# Patient Record
Sex: FEMALE | Race: WHITE | NOT HISPANIC OR LATINO | Employment: OTHER | URBAN - METROPOLITAN AREA
[De-identification: names, ages, dates, MRNs, and addresses within clinical notes are randomized per-mention and may not be internally consistent; named-entity substitution may affect disease eponyms.]

---

## 2017-02-20 DIAGNOSIS — E11.22 TYPE 2 DIABETES MELLITUS WITH STAGE 3 CHRONIC KIDNEY DISEASE, WITHOUT LONG-TERM CURRENT USE OF INSULIN (H): ICD-10-CM

## 2017-02-20 DIAGNOSIS — N18.30 TYPE 2 DIABETES MELLITUS WITH STAGE 3 CHRONIC KIDNEY DISEASE, WITHOUT LONG-TERM CURRENT USE OF INSULIN (H): ICD-10-CM

## 2017-02-20 RX ORDER — GLIPIZIDE 5 MG/1
5 TABLET, FILM COATED, EXTENDED RELEASE ORAL DAILY
Qty: 90 TABLET | Refills: 1 | Status: SHIPPED | OUTPATIENT
Start: 2017-02-20

## 2017-02-20 NOTE — TELEPHONE ENCOUNTER
glipizide         Last Written Prescription Date: 11/21/16  Last Fill Quantity: 90, # refills: 1  Last Office Visit with G, P or [facility] prescribing provider:  11/21/16        BP Readings from Last 3 Encounters:   11/23/16 (!) 162/92   11/21/16 (!) 148/96   06/27/16 134/80     Lab Results   Component Value Date    MICROL 13 11/21/2016     No results found for: MICROALBUMIN  Creatinine   Date Value Ref Range Status   11/21/2016 0.98 0.52 - 1.04 mg/dL Final   ]  GFR Estimate   Date Value Ref Range Status   11/21/2016 54 (L) >60 mL/min/1.7m2 Final     Comment:     Non  GFR Calc   12/01/2015 61 >60 mL/min/1.7m2 Final     Comment:     Non  GFR Calc   11/16/2015 58 (L) >60 mL/min/1.7m2 Final     Comment:     Non  GFR Calc     GFR Estimate If Black   Date Value Ref Range Status   11/21/2016 66 >60 mL/min/1.7m2 Final     Comment:      GFR Calc   12/01/2015 73 >60 mL/min/1.7m2 Final     Comment:      GFR Calc   11/16/2015 70 >60 mL/min/1.7m2 Final     Comment:      GFR Calc     Lab Results   Component Value Date    CHOL 222 11/21/2016     Lab Results   Component Value Date    HDL 40 11/21/2016     Lab Results   Component Value Date     11/21/2016     Lab Results   Component Value Date    TRIG 254 11/21/2016     Lab Results   Component Value Date    CHOLHDLRATIO 5.3 01/14/2015     Lab Results   Component Value Date    AST 18 11/21/2016     Lab Results   Component Value Date    ALT 21 11/21/2016     Lab Results   Component Value Date    A1C 6.6 11/21/2016    A1C 6.4 11/16/2015    A1C 6.2 01/14/2015    A1C 6.6 06/05/2014    A1C 6.4 11/13/2013     Potassium   Date Value Ref Range Status   11/21/2016 4.3 3.4 - 5.3 mmol/L Final

## 2017-06-06 DIAGNOSIS — N18.30 TYPE 2 DIABETES MELLITUS WITH STAGE 3 CHRONIC KIDNEY DISEASE, WITHOUT LONG-TERM CURRENT USE OF INSULIN (H): ICD-10-CM

## 2017-06-06 DIAGNOSIS — E11.22 TYPE 2 DIABETES MELLITUS WITH STAGE 3 CHRONIC KIDNEY DISEASE, WITHOUT LONG-TERM CURRENT USE OF INSULIN (H): ICD-10-CM

## 2017-06-07 RX ORDER — GLIPIZIDE 5 MG/1
TABLET, FILM COATED, EXTENDED RELEASE ORAL
Qty: 30 TABLET | Refills: 0 | Status: SHIPPED | OUTPATIENT
Start: 2017-06-07 | End: 2017-06-20

## 2017-06-07 NOTE — TELEPHONE ENCOUNTER
Medication is being filled for 1 time refill only due to:  Patient needs to be seen because due for 6 month check..   Francy Ngo RN

## 2017-06-07 NOTE — TELEPHONE ENCOUNTER
glipiZIDE (GLUCOTROL XL) 5 MG 24 hr tablet         Last Written Prescription Date: 2/20/17  Last Fill Quantity: 90, # refills: 1  Last Office Visit with [facility], [facility] or [facility] prescribing provider:  11/21/16        BP Readings from Last 3 Encounters:   11/23/16 (!) 162/92   11/21/16 (!) 148/96   06/27/16 134/80     Lab Results   Component Value Date    MICROL 13 11/21/2016     Lab Results   Component Value Date    UMALCR 13.90 11/21/2016     Creatinine   Date Value Ref Range Status   11/21/2016 0.98 0.52 - 1.04 mg/dL Final   ]  GFR Estimate   Date Value Ref Range Status   11/21/2016 54 (L) >60 mL/min/1.7m2 Final     Comment:     Non  GFR Calc   12/01/2015 61 >60 mL/min/1.7m2 Final     Comment:     Non  GFR Calc   11/16/2015 58 (L) >60 mL/min/1.7m2 Final     Comment:     Non  GFR Calc     GFR Estimate If Black   Date Value Ref Range Status   11/21/2016 66 >60 mL/min/1.7m2 Final     Comment:      GFR Calc   12/01/2015 73 >60 mL/min/1.7m2 Final     Comment:      GFR Calc   11/16/2015 70 >60 mL/min/1.7m2 Final     Comment:      GFR Calc     Lab Results   Component Value Date    CHOL 222 11/21/2016     Lab Results   Component Value Date    HDL 40 11/21/2016     Lab Results   Component Value Date     11/21/2016     Lab Results   Component Value Date    TRIG 254 11/21/2016     Lab Results   Component Value Date    CHOLHDLRATIO 5.3 01/14/2015     Lab Results   Component Value Date    AST 18 11/21/2016     Lab Results   Component Value Date    ALT 21 11/21/2016     Lab Results   Component Value Date    A1C 6.6 11/21/2016    A1C 6.4 11/16/2015    A1C 6.2 01/14/2015    A1C 6.6 06/05/2014    A1C 6.4 11/13/2013     Potassium   Date Value Ref Range Status   11/21/2016 4.3 3.4 - 5.3 mmol/L Final

## 2017-06-15 DIAGNOSIS — I10 ESSENTIAL HYPERTENSION WITH GOAL BLOOD PRESSURE LESS THAN 140/90: ICD-10-CM

## 2017-06-16 RX ORDER — CARVEDILOL 25 MG/1
TABLET ORAL
Qty: 180 TABLET | Refills: 0 | Status: SHIPPED | OUTPATIENT
Start: 2017-06-16

## 2017-06-16 NOTE — TELEPHONE ENCOUNTER
Routing refill request to provider for review/approval because:  Blood pressures are elevated above goal.    Cherelle Winter RN

## 2017-06-16 NOTE — TELEPHONE ENCOUNTER
Carvedilol       Last Written Prescription Date: 11/21/2016  Last Fill Quantity: 180, # refills: 1    Last Office Visit with G, [facility] or [facility] prescribing provider:  11/21/2016   Future Office Visit:        BP Readings from Last 3 Encounters:   11/23/16 (!) 162/92   11/21/16 (!) 148/96   06/27/16 134/80

## 2017-06-20 ENCOUNTER — OFFICE VISIT (OUTPATIENT)
Dept: ORTHOPEDICS | Facility: CLINIC | Age: 82
End: 2017-06-20
Payer: COMMERCIAL

## 2017-06-20 VITALS — TEMPERATURE: 97.7 F | BODY MASS INDEX: 33.15 KG/M2 | WEIGHT: 199 LBS | HEIGHT: 65 IN

## 2017-06-20 DIAGNOSIS — M25.561 PATELLOFEMORAL ARTHRALGIA OF RIGHT KNEE: ICD-10-CM

## 2017-06-20 DIAGNOSIS — M17.11 PRIMARY OSTEOARTHRITIS OF RIGHT KNEE: Primary | ICD-10-CM

## 2017-06-20 PROCEDURE — 99213 OFFICE O/P EST LOW 20 MIN: CPT | Mod: 25 | Performed by: ORTHOPAEDIC SURGERY

## 2017-06-20 PROCEDURE — 20610 DRAIN/INJ JOINT/BURSA W/O US: CPT | Mod: RT | Performed by: ORTHOPAEDIC SURGERY

## 2017-06-20 ASSESSMENT — PAIN SCALES - GENERAL: PAINLEVEL: MODERATE PAIN (5)

## 2017-06-20 NOTE — MR AVS SNAPSHOT
"              After Visit Summary   6/20/2017    Patsy J Behm    MRN: [id number]           Patient Information     Date Of Birth          1935        Visit Information        Provider Department      6/20/2017 2:00 PM Adam Greer MD [facility]        Today's Diagnoses     Primary osteoarthritis of right knee    -  1    Patellofemoral arthralgia of right knee           Follow-ups after your visit        Who to contact     If you have questions or need follow up information about today's clinic visit or your schedule please contact [facility] directly at [phone].  Normal or non-critical lab and imaging results will be communicated to you by [facility] [employer], letter or phone within 4 business days after the clinic has received the results. If you do not hear from us within 7 days, please contact the clinic through [email or web address] or phone. If you have a critical or abnormal lab result, we will notify you by phone as soon as possible.  Submit refill requests through [employer] or call your pharmacy and they will forward the refill request to us. Please allow 3 business days for your refill to be completed.          Additional Information About Your Visit        MyChart Information     [employer] gives you secure access to your electronic health record. If you see a primary care provider, you can also send messages to your care team and make appointments. If you have questions, please call your primary care clinic.  If you do not have a primary care provider, please call [phone] and they will assist you.        Care EveryWhere ID     This is your Care EveryWhere ID. This could be used by other organizations to access your [place] medical records  [id number]        Your Vitals Were     Temperature Height BMI (Body Mass Index)             97.7  F (36.5  C) (Temporal) 1.638 m (5' 4.5\") 33.63 kg/m2          Blood Pressure from Last 3 Encounters:   11/23/16 (!) 162/92   11/21/16 (!) 148/96 "   06/27/16 134/80    Weight from Last 3 Encounters:   06/20/17 90.3 kg (199 lb)   11/21/16 96.2 kg (212 lb)   09/29/16 95.3 kg (210 lb)              We Performed the Following     C SYNVISC PER 1 MG     Large Joint/Bursa injection and/or drainage (Shoulder, Knee)          Today's Medication Changes          These changes are accurate as of: 6/20/17  5:40 PM.  If you have any questions, ask your nurse or doctor.               Start taking these medicines.        Dose/Directions    Hylan 48 MG/6ML Sosy injection   Commonly known as:  SYNVISC ONE   Used for:  Primary osteoarthritis of right knee, Patellofemoral arthralgia of right knee   Started by:  Adam Greer MD        Dose:  16 mg   16 mg by INTRA-ARTICULAR route once for 1 dose   Quantity:  2 mL   Refills:  0            Where to get your medicines      Some of these will need a paper prescription and others can be bought over the counter.  Ask your nurse if you have questions.     You don't need a prescription for these medications     Hylan 48 MG/6ML Sosy injection                Primary Care Provider Office Phone # Fax #    Lul Hsu [phone]       [facility] [address] [place] [address]        Thank you!     Thank you for choosing [facility]  for your care. Our goal is always to provide you with excellent care. Hearing back from our patients is one way we can continue to improve our services. Please take a few minutes to complete the written survey that you may receive in the mail after your visit with us. Thank you!             Your Updated Medication List - Protect others around you: Learn how to safely use, store and throw away your medicines at www.disposemymeds.org.          This list is accurate as of: 6/20/17  5:40 PM.  Always use your most recent med list.                   Brand Name Dispense Instructions for use    acetaminophen 650 MG 8 hour tablet     100 tablet    Take 650 mg  by mouth every 4 hours as needed for other (surgical pain)       ascorbic acid 500 MG tablet    VITAMIN C     1 TABLET DAILY       ASPIRIN NOT PRESCRIBED    INTENTIONAL     by Other route continuous prn.       blood glucose monitoring lancets     1 Each    TEST 2 TIMES A DAY       * blood glucose monitoring test strip    no brand specified    50 each    by In Vitro route. Test once daily       * ONE TOUCH ULTRA test strip   Generic drug:  blood glucose monitoring     100 strip    USE ONE STRIP TWICE DAILY       carvedilol 25 MG tablet    COREG    180 tablet    TAKE ONE TABLET BY MOUTH TWICE DAILY WITH MEALS       CoQ10 100 MG Caps      Take 1 capsule by mouth daily       diphenhydrAMINE-acetaminophen  MG tablet    TYLENOL PM     Take 1 tablet by mouth nightly as needed for sleep       FIBERCON PO      Take by mouth daily       furosemide 20 MG tablet    LASIX    45 tablet    Take 0.5 tablets (10 mg) by mouth daily       Ginkgo Biloba 120 MG Caps      1 CAPSULE TWICE DAILY       glipiZIDE 5 MG 24 hr tablet    GLUCOTROL XL    90 tablet    Take 1 tablet (5 mg) by mouth daily       Hylan 48 MG/6ML Sosy injection    SYNVISC ONE    2 mL    16 mg by INTRA-ARTICULAR route once for 1 dose       losartan 100 MG tablet    COZAAR    45 tablet    Take 1 tablet (100 mg) by mouth daily       Lutein 15-0.7 MG Caps      20 mg daily       Melatonin 10 MG Caps      Take by mouth At Bedtime       MULTI-DAY Tabs      1 TABLET DAILY       Red Yeast Rice 600 MG Tabs      Take 2 tablets by mouth 2 times daily       * Notice:  This list has 2 medication(s) that are the same as other medications prescribed for you. Read the directions carefully, and ask your doctor or other care provider to review them with you.

## 2017-06-20 NOTE — PROGRESS NOTES
"   HISTORY OF PRESENT ILLNESS:    Patsy J Behm is a 81 year old female who is seen in follow up for   Chief Complaint   Patient presents with     RECHECK     Right knee pain.     Intake     Pain started on Sunday.  pain started with pain in the back of knee but quickly progressed to sharp pain that she could not step on.   Present symptoms:  Patient reports as above. She stated the pain began almost instantaneously with no known injury or irritation to her right knee. She stated it was almost 9 months to the day she obtained her previous Synvisc just a couple days after. Patient states she now lives in [place]  Treatments tried to this point: Previous cortisone as well as Synvisc injections  Family present: Daughter  Patient evaluation done with Dr. Greer    Physical Exam:  Vitals: Temp 97.7  F (36.5  C) (Temporal)  Ht 1.638 m (5' 4.5\")  Wt 90.3 kg (199 lb)  BMI 33.63 kg/m2  BMI= Body mass index is 33.63 kg/(m^2).  Constitutional: healthy, alert and no acute distress   Psychiatric: mentation appears normal and affect normal/bright  NEURO: no focal deficits  SKIN: no excoriation or erythema. No signs of infection.  JOINT/EXTREMITIES:  Affected extremity pulses are easily palpable.  Right Knee Exam: Inspection: No significant effusion  Tender: lateral joint line, medial joint line, patient is tender almost diffusely through the knee but greatest at the joint lines.  Active Range of Motion:  Patient is able fully straighten her knee and flex to 120  or better  GAIT: Very antalgic    IMAGING INTERPRETATION: X-rays were reviewed. Osteophytes are present at the medial aspect of the knee and at the patellofemoral joint. There is minimal space between the patella and the femoral groove that is bone-on-bone  Independent visualization of the images was performed.     ASSESSMENT:    Chief Complaint   Patient presents with     RECHECK     Right knee pain.     Intake     Pain started on Sunday.  pain started with pain in " the back of knee but quickly progressed to sharp pain that she could not step on.       ICD-10-CM    1. Primary osteoarthritis of right knee M17.11    2. Patellofemoral arthralgia of right knee M25.561      Patient with considerable patellofemoral osteoarthritis that does also affect the medial compartment as well.    Plan:   Synvisc injection worked well for about 9 months in the past. Patient is seeking repeat injection    After risks and benefits were explained and questions answered, the patient agreed to undergo the recommended procedure .   Using aseptic technique the skin was prepped with betadine swabs, then sprayed with ethyl chloride for topical anesthesia.  The right  Knee  lateral infrapatellar: joint space was then infiltrated with 5cc of 1% Lidocaine without epinephrine and 6ml of Synvisc One.  There were no complications and the patient tolerated the procedure well. Patient is advised can take up to 6 weeks for the benefit to be realized.  She can repeat this injection as soon as every 6 months    Return to clinic as needed.      Scribed by  Mi Moses PA-C   6/20/2017  3:06 PM      I attest I have seen and evaluated the patient.  I agree with above impression and plan.    Adam Greer MD

## 2017-06-20 NOTE — NURSING NOTE
"Chief Complaint   Patient presents with     RECHECK     Right knee pain.     Intake     Pain started on Sunday.  pain started with pain in the back of knee but quickly progressed to sharp pain that she could not step on.       Initial Temp 97.7  F (36.5  C) (Temporal)  Ht 1.638 m (5' 4.5\")  Wt 90.3 kg (199 lb)  BMI 33.63 kg/m2 Estimated body mass index is 33.63 kg/(m^2) as calculated from the following:    Height as of this encounter: 1.638 m (5' 4.5\").    Weight as of this encounter: 90.3 kg (199 lb).  Medication Reconciliation: complete   ROSIE Rosales  "

## 2017-06-20 NOTE — LETTER
"  6/20/2017       RE: Patsy J Behm  [address]   [place] [address]           Dear Colleague,    Thank you for referring your patient, Patsy J Behm, to the [facility]. Please see a copy of my visit note below.       HISTORY OF PRESENT ILLNESS:    Patsy J Behm is a 81 year old female who is seen in follow up for   Chief Complaint   Patient presents with     RECHECK     Right knee pain.     Intake     Pain started on Sunday.  pain started with pain in the back of knee but quickly progressed to sharp pain that she could not step on.   Present symptoms:  Patient reports as above. She stated the pain began almost instantaneously with no known injury or irritation to her right knee. She stated it was almost 9 months to the day she obtained her previous Synvisc just a couple days after. Patient states she now lives in [place]  Treatments tried to this point: Previous cortisone as well as Synvisc injections  Family present: Daughter  Patient evaluation done with Dr. Greer    Physical Exam:  Vitals: Temp 97.7  F (36.5  C) (Temporal)  Ht 1.638 m (5' 4.5\")  Wt 90.3 kg (199 lb)  BMI 33.63 kg/m2  BMI= Body mass index is 33.63 kg/(m^2).  Constitutional: healthy, alert and no acute distress   Psychiatric: mentation appears normal and affect normal/bright  NEURO: no focal deficits  SKIN: no excoriation or erythema. No signs of infection.  JOINT/EXTREMITIES:  Affected extremity pulses are easily palpable.  Right Knee Exam: Inspection: No significant effusion  Tender: lateral joint line, medial joint line, patient is tender almost diffusely through the knee but greatest at the joint lines.  Active Range of Motion:  Patient is able fully straighten her knee and flex to 120  or better  GAIT: Very antalgic    IMAGING INTERPRETATION: X-rays were reviewed. Osteophytes are present at the medial aspect of the knee and at the patellofemoral joint. There is minimal space between the patella and the femoral " groove that is bone-on-bone  Independent visualization of the images was performed.     ASSESSMENT:    Chief Complaint   Patient presents with     RECHECK     Right knee pain.     Intake     Pain started on Sunday.  pain started with pain in the back of knee but quickly progressed to sharp pain that she could not step on.       ICD-10-CM    1. Primary osteoarthritis of right knee M17.11    2. Patellofemoral arthralgia of right knee M25.561      Patient with considerable patellofemoral osteoarthritis that does also affect the medial compartment as well.    Plan:   Synvisc injection worked well for about 9 months in the past. Patient is seeking repeat injection    After risks and benefits were explained and questions answered, the patient agreed to undergo the recommended procedure .   Using aseptic technique the skin was prepped with betadine swabs, then sprayed with ethyl chloride for topical anesthesia.  The right  Knee  lateral infrapatellar: joint space was then infiltrated with 5cc of 1% Lidocaine without epinephrine and 6ml of Synvisc One.  There were no complications and the patient tolerated the procedure well. Patient is advised can take up to 6 weeks for the benefit to be realized.  She can repeat this injection as soon as every 6 months    Return to clinic as needed.      Scribed by  Mi Moses PA-C   6/20/2017  3:06 PM      I attest I have seen and evaluated the patient.  I agree with above impression and plan.    Adam Greer MD    Again, thank you for allowing me to participate in the care of your patient.        Sincerely,              Adam Greer MD

## 2017-07-17 DIAGNOSIS — N18.30 TYPE 2 DIABETES MELLITUS WITH STAGE 3 CHRONIC KIDNEY DISEASE, WITHOUT LONG-TERM CURRENT USE OF INSULIN (H): ICD-10-CM

## 2017-07-17 DIAGNOSIS — I10 ESSENTIAL HYPERTENSION WITH GOAL BLOOD PRESSURE LESS THAN 140/90: ICD-10-CM

## 2017-07-17 DIAGNOSIS — E11.22 TYPE 2 DIABETES MELLITUS WITH STAGE 3 CHRONIC KIDNEY DISEASE, WITHOUT LONG-TERM CURRENT USE OF INSULIN (H): ICD-10-CM

## 2017-07-17 NOTE — TELEPHONE ENCOUNTER
Losartan,       Last Written Prescription Date: 12/3/15  Last Fill Quantity: 45, # refills: 5  Last Office Visit with [facility], [facility] or [facility] prescribing provider: 11/21/16       Potassium   Date Value Ref Range Status   11/21/2016 4.3 3.4 - 5.3 mmol/L Final     Creatinine   Date Value Ref Range Status   11/21/2016 0.98 0.52 - 1.04 mg/dL Final     BP Readings from Last 3 Encounters:   11/23/16 (!) 162/92   11/21/16 (!) 148/96   06/27/16 134/80       Glipizide         Last Written Prescription Date: 2/20/17  Last Fill Quantity: 90, # refills: 1  Last Office Visit with [facility], [facility] or [facility] prescribing provider:  11/21/16        BP Readings from Last 3 Encounters:   11/23/16 (!) 162/92   11/21/16 (!) 148/96   06/27/16 134/80     Lab Results   Component Value Date    MICROL 13 11/21/2016     Lab Results   Component Value Date    UMALCR 13.90 11/21/2016     Creatinine   Date Value Ref Range Status   11/21/2016 0.98 0.52 - 1.04 mg/dL Final   ]  GFR Estimate   Date Value Ref Range Status   11/21/2016 54 (L) >60 mL/min/1.7m2 Final     Comment:     Non  GFR Calc   12/01/2015 61 >60 mL/min/1.7m2 Final     Comment:     Non  GFR Calc   11/16/2015 58 (L) >60 mL/min/1.7m2 Final     Comment:     Non  GFR Calc     GFR Estimate If Black   Date Value Ref Range Status   11/21/2016 66 >60 mL/min/1.7m2 Final     Comment:      GFR Calc   12/01/2015 73 >60 mL/min/1.7m2 Final     Comment:      GFR Calc   11/16/2015 70 >60 mL/min/1.7m2 Final     Comment:      GFR Calc     Lab Results   Component Value Date    CHOL 222 11/21/2016     Lab Results   Component Value Date    HDL 40 11/21/2016     Lab Results   Component Value Date     11/21/2016     Lab Results   Component Value Date    TRIG 254 11/21/2016     Lab Results   Component Value Date    CHOLHDLRATIO 5.3 01/14/2015     Lab Results   Component Value Date    AST 18 11/21/2016     Lab  Results   Component Value Date    ALT 21 11/21/2016     Lab Results   Component Value Date    A1C 6.6 11/21/2016    A1C 6.4 11/16/2015    A1C 6.2 01/14/2015    A1C 6.6 06/05/2014    A1C 6.4 11/13/2013     Potassium   Date Value Ref Range Status   11/21/2016 4.3 3.4 - 5.3 mmol/L Final

## 2017-07-19 RX ORDER — GLIPIZIDE 5 MG/1
TABLET, FILM COATED, EXTENDED RELEASE ORAL
Qty: 30 TABLET | Refills: 0 | Status: SHIPPED | OUTPATIENT
Start: 2017-07-19

## 2017-07-19 RX ORDER — LOSARTAN POTASSIUM 100 MG/1
TABLET ORAL
Qty: 135 TABLET | Refills: 0 | Status: SHIPPED | OUTPATIENT
Start: 2017-07-19

## 2018-04-27 ENCOUNTER — TELEPHONE (OUTPATIENT)
Dept: PODIATRY | Facility: CLINIC | Age: 83
End: 2018-04-27

## 2018-04-27 NOTE — TELEPHONE ENCOUNTER
Our goal is to have forms completed with 72 hours, however some forms may require a visit or additional information.    Who is the form from?: Patient  Where the form came from: form was faxed in  What clinic location was the form placed at?: [place]  Where the form was placed: '[facility]  What number is listed as a contact on the form?: [phone]     Phone call message- patient request for a letter, form or note:    Date needed: as soon as possible  Please fax to [phone]  Has the patient signed a consent form for release of information? YES    Additional comments: Please fax     Call taken on 4/27/2018 at 2:40 PM by Samantha Cisneros    Type of letter, form or note: disability

## 2020-02-10 ENCOUNTER — HEALTH MAINTENANCE LETTER (OUTPATIENT)
Age: 85
End: 2020-02-10

## 2020-11-16 ENCOUNTER — HEALTH MAINTENANCE LETTER (OUTPATIENT)
Age: 85
End: 2020-11-16

## 2021-04-04 ENCOUNTER — HEALTH MAINTENANCE LETTER (OUTPATIENT)
Age: 86
End: 2021-04-04

## 2021-05-29 ENCOUNTER — HEALTH MAINTENANCE LETTER (OUTPATIENT)
Age: 86
End: 2021-05-29

## 2021-09-18 ENCOUNTER — HEALTH MAINTENANCE LETTER (OUTPATIENT)
Age: 86
End: 2021-09-18

## 2022-01-08 ENCOUNTER — HEALTH MAINTENANCE LETTER (OUTPATIENT)
Age: 87
End: 2022-01-08

## 2022-04-30 ENCOUNTER — HEALTH MAINTENANCE LETTER (OUTPATIENT)
Age: 87
End: 2022-04-30

## 2022-08-20 ENCOUNTER — HEALTH MAINTENANCE LETTER (OUTPATIENT)
Age: 87
End: 2022-08-20

## 2022-11-20 ENCOUNTER — HEALTH MAINTENANCE LETTER (OUTPATIENT)
Age: 87
End: 2022-11-20

## 2023-04-15 ENCOUNTER — HEALTH MAINTENANCE LETTER (OUTPATIENT)
Age: 88
End: 2023-04-15

## 2023-06-01 ENCOUNTER — HEALTH MAINTENANCE LETTER (OUTPATIENT)
Age: 88
End: 2023-06-01

## 2023-11-19 ENCOUNTER — HEALTH MAINTENANCE LETTER (OUTPATIENT)
Age: 88
End: 2023-11-19